# Patient Record
Sex: FEMALE | Race: WHITE | NOT HISPANIC OR LATINO | Employment: OTHER | URBAN - NONMETROPOLITAN AREA
[De-identification: names, ages, dates, MRNs, and addresses within clinical notes are randomized per-mention and may not be internally consistent; named-entity substitution may affect disease eponyms.]

---

## 2017-01-19 ENCOUNTER — TRANSCRIBE ORDERS (OUTPATIENT)
Dept: ADMINISTRATIVE | Facility: HOSPITAL | Age: 72
End: 2017-01-19

## 2017-01-19 DIAGNOSIS — Z13.9 SCREENING: Primary | ICD-10-CM

## 2017-02-03 ENCOUNTER — HOSPITAL ENCOUNTER (OUTPATIENT)
Dept: MAMMOGRAPHY | Facility: HOSPITAL | Age: 72
Discharge: HOME | End: 2017-02-03
Admitting: NURSE PRACTITIONER

## 2017-02-03 DIAGNOSIS — Z13.9 SCREENING: ICD-10-CM

## 2017-02-03 PROCEDURE — G0202 SCR MAMMO BI INCL CAD: HCPCS

## 2017-02-03 PROCEDURE — 77063 BREAST TOMOSYNTHESIS BI: CPT

## 2017-05-09 ENCOUNTER — OFFICE VISIT (OUTPATIENT)
Dept: SURGERY | Facility: CLINIC | Age: 72
End: 2017-05-09

## 2017-05-09 VITALS
SYSTOLIC BLOOD PRESSURE: 168 MMHG | RESPIRATION RATE: 16 BRPM | HEART RATE: 61 BPM | TEMPERATURE: 97.4 F | BODY MASS INDEX: 25.76 KG/M2 | WEIGHT: 154.6 LBS | HEIGHT: 65 IN | DIASTOLIC BLOOD PRESSURE: 62 MMHG | OXYGEN SATURATION: 99 %

## 2017-05-09 DIAGNOSIS — L72.3 SEBACEOUS CYST: ICD-10-CM

## 2017-05-09 DIAGNOSIS — M79.621 AXILLARY PAIN, RIGHT: Primary | ICD-10-CM

## 2017-05-09 PROCEDURE — 99203 OFFICE O/P NEW LOW 30 MIN: CPT | Performed by: SURGERY

## 2017-05-09 RX ORDER — LOSARTAN POTASSIUM 25 MG/1
25 TABLET ORAL DAILY
COMMUNITY

## 2017-05-09 RX ORDER — BIMATOPROST 0.01 %
DROPS OPHTHALMIC (EYE)
COMMUNITY
Start: 2017-04-03

## 2017-05-09 RX ORDER — SIMVASTATIN 40 MG
TABLET ORAL
COMMUNITY
Start: 2017-03-20

## 2017-05-09 RX ORDER — LOSARTAN POTASSIUM 25 MG/1
TABLET ORAL
COMMUNITY
Start: 2017-03-20 | End: 2017-05-09

## 2017-05-23 ENCOUNTER — OFFICE VISIT (OUTPATIENT)
Dept: SURGERY | Facility: CLINIC | Age: 72
End: 2017-05-23

## 2017-05-23 VITALS
SYSTOLIC BLOOD PRESSURE: 128 MMHG | DIASTOLIC BLOOD PRESSURE: 58 MMHG | RESPIRATION RATE: 16 BRPM | BODY MASS INDEX: 25.66 KG/M2 | WEIGHT: 154 LBS | OXYGEN SATURATION: 97 % | HEART RATE: 83 BPM | TEMPERATURE: 97.7 F | HEIGHT: 65 IN

## 2017-05-23 DIAGNOSIS — L72.3 SEBACEOUS CYST: Primary | ICD-10-CM

## 2017-05-23 PROCEDURE — 11402 EXC TR-EXT B9+MARG 1.1-2 CM: CPT | Performed by: SURGERY

## 2017-05-23 PROCEDURE — 12031 INTMD RPR S/A/T/EXT 2.5 CM/<: CPT | Performed by: SURGERY

## 2017-05-30 ENCOUNTER — OFFICE VISIT (OUTPATIENT)
Dept: SURGERY | Facility: CLINIC | Age: 72
End: 2017-05-30

## 2017-05-30 VITALS
BODY MASS INDEX: 25.66 KG/M2 | WEIGHT: 154 LBS | HEIGHT: 65 IN | TEMPERATURE: 98 F | HEART RATE: 80 BPM | DIASTOLIC BLOOD PRESSURE: 60 MMHG | SYSTOLIC BLOOD PRESSURE: 124 MMHG

## 2017-05-30 DIAGNOSIS — Z48.89 POSTOPERATIVE VISIT: Primary | ICD-10-CM

## 2017-05-30 PROCEDURE — 99024 POSTOP FOLLOW-UP VISIT: CPT | Performed by: SURGERY

## 2018-01-04 ENCOUNTER — TRANSCRIBE ORDERS (OUTPATIENT)
Dept: MAMMOGRAPHY | Facility: HOSPITAL | Age: 73
End: 2018-01-04

## 2018-01-04 DIAGNOSIS — Z12.39 BREAST CANCER SCREENING: Primary | ICD-10-CM

## 2018-02-05 ENCOUNTER — HOSPITAL ENCOUNTER (OUTPATIENT)
Dept: MAMMOGRAPHY | Facility: HOSPITAL | Age: 73
Discharge: HOME | End: 2018-02-05
Admitting: FAMILY MEDICINE

## 2018-02-05 DIAGNOSIS — Z12.39 BREAST CANCER SCREENING: ICD-10-CM

## 2018-02-05 PROCEDURE — 77063 BREAST TOMOSYNTHESIS BI: CPT

## 2018-02-05 PROCEDURE — 77067 SCR MAMMO BI INCL CAD: CPT

## 2019-01-07 ENCOUNTER — TRANSCRIBE ORDERS (OUTPATIENT)
Dept: ADMINISTRATIVE | Facility: HOSPITAL | Age: 74
End: 2019-01-07

## 2019-01-07 DIAGNOSIS — Z12.39 SCREENING BREAST EXAMINATION: Primary | ICD-10-CM

## 2019-02-08 ENCOUNTER — HOSPITAL ENCOUNTER (OUTPATIENT)
Dept: MAMMOGRAPHY | Facility: HOSPITAL | Age: 74
Discharge: HOME | End: 2019-02-08
Admitting: PHYSICIAN ASSISTANT

## 2019-02-08 DIAGNOSIS — Z12.39 SCREENING BREAST EXAMINATION: ICD-10-CM

## 2019-02-08 PROCEDURE — 77063 BREAST TOMOSYNTHESIS BI: CPT

## 2019-02-08 PROCEDURE — 77067 SCR MAMMO BI INCL CAD: CPT

## 2020-01-06 ENCOUNTER — TRANSCRIBE ORDERS (OUTPATIENT)
Dept: ADMINISTRATIVE | Facility: HOSPITAL | Age: 75
End: 2020-01-06

## 2020-01-06 DIAGNOSIS — Z12.31 BREAST CANCER SCREENING BY MAMMOGRAM: Primary | ICD-10-CM

## 2020-02-18 ENCOUNTER — HOSPITAL ENCOUNTER (OUTPATIENT)
Dept: MAMMOGRAPHY | Facility: HOSPITAL | Age: 75
Discharge: HOME | End: 2020-02-18
Admitting: PHYSICIAN ASSISTANT

## 2020-02-18 DIAGNOSIS — Z12.31 BREAST CANCER SCREENING BY MAMMOGRAM: ICD-10-CM

## 2020-02-18 PROCEDURE — 77067 SCR MAMMO BI INCL CAD: CPT

## 2020-02-18 PROCEDURE — 77063 BREAST TOMOSYNTHESIS BI: CPT

## 2020-09-28 ENCOUNTER — LAB (OUTPATIENT)
Dept: LAB | Facility: HOSPITAL | Age: 75
End: 2020-09-28

## 2020-09-28 ENCOUNTER — TRANSCRIBE ORDERS (OUTPATIENT)
Dept: ADMINISTRATIVE | Facility: HOSPITAL | Age: 75
End: 2020-09-28

## 2020-09-28 DIAGNOSIS — M79.10 MYALGIA: ICD-10-CM

## 2020-09-28 DIAGNOSIS — M13.0 POLYARTHROPATHY: ICD-10-CM

## 2020-09-28 DIAGNOSIS — D89.89 RADIATION CHIMERA (HCC): ICD-10-CM

## 2020-09-28 DIAGNOSIS — M13.0 POLYARTHROPATHY: Primary | ICD-10-CM

## 2020-09-28 LAB
CK SERPL-CCNC: 67 U/L (ref 20–180)
CRP SERPL-MCNC: 5.87 MG/DL (ref 0–0.5)
ERYTHROCYTE [SEDIMENTATION RATE] IN BLOOD: 49 MM/HR (ref 0–30)
URATE SERPL-MCNC: 3.8 MG/DL (ref 2.4–5.7)

## 2020-09-28 PROCEDURE — 84550 ASSAY OF BLOOD/URIC ACID: CPT

## 2020-09-28 PROCEDURE — 86140 C-REACTIVE PROTEIN: CPT

## 2020-09-28 PROCEDURE — 82550 ASSAY OF CK (CPK): CPT

## 2020-09-28 PROCEDURE — 85652 RBC SED RATE AUTOMATED: CPT

## 2020-09-28 PROCEDURE — 36415 COLL VENOUS BLD VENIPUNCTURE: CPT

## 2021-01-11 ENCOUNTER — TRANSCRIBE ORDERS (OUTPATIENT)
Dept: ADMINISTRATIVE | Facility: HOSPITAL | Age: 76
End: 2021-01-11

## 2021-01-11 DIAGNOSIS — Z12.31 VISIT FOR SCREENING MAMMOGRAM: Primary | ICD-10-CM

## 2021-02-24 ENCOUNTER — HOSPITAL ENCOUNTER (OUTPATIENT)
Dept: MAMMOGRAPHY | Facility: HOSPITAL | Age: 76
Discharge: HOME | End: 2021-02-24
Admitting: FAMILY MEDICINE

## 2021-02-24 DIAGNOSIS — Z12.31 VISIT FOR SCREENING MAMMOGRAM: ICD-10-CM

## 2021-02-24 PROCEDURE — 77063 BREAST TOMOSYNTHESIS BI: CPT

## 2021-02-24 PROCEDURE — 77067 SCR MAMMO BI INCL CAD: CPT

## 2021-03-31 ENCOUNTER — IMMUNIZATION (OUTPATIENT)
Dept: VACCINE CLINIC | Facility: HOSPITAL | Age: 76
End: 2021-03-31

## 2021-03-31 PROCEDURE — 0001A: CPT | Performed by: INTERNAL MEDICINE

## 2021-03-31 PROCEDURE — 91300 HC SARSCOV02 VAC 30MCG/0.3ML IM: CPT | Performed by: INTERNAL MEDICINE

## 2021-04-21 ENCOUNTER — IMMUNIZATION (OUTPATIENT)
Dept: VACCINE CLINIC | Facility: HOSPITAL | Age: 76
End: 2021-04-21

## 2021-04-21 PROCEDURE — 91300 HC SARSCOV02 VAC 30MCG/0.3ML IM: CPT | Performed by: INTERNAL MEDICINE

## 2021-04-21 PROCEDURE — 0002A: CPT | Performed by: INTERNAL MEDICINE

## 2022-02-11 ENCOUNTER — APPOINTMENT (OUTPATIENT)
Dept: URBAN - METROPOLITAN AREA CLINIC 289 | Age: 77
Setting detail: DERMATOLOGY
End: 2022-02-11

## 2022-02-11 DIAGNOSIS — L30.8 OTHER SPECIFIED DERMATITIS: ICD-10-CM

## 2022-02-11 DIAGNOSIS — Z85.828 PERSONAL HISTORY OF OTHER MALIGNANT NEOPLASM OF SKIN: ICD-10-CM

## 2022-02-11 DIAGNOSIS — L57.0 ACTINIC KERATOSIS: ICD-10-CM

## 2022-02-11 PROCEDURE — 99204 OFFICE O/P NEW MOD 45 MIN: CPT | Mod: 25

## 2022-02-11 PROCEDURE — 17000 DESTRUCT PREMALG LESION: CPT

## 2022-02-11 PROCEDURE — OTHER LIQUID NITROGEN: OTHER

## 2022-02-11 PROCEDURE — OTHER COUNSELING: OTHER

## 2022-02-11 PROCEDURE — OTHER SUNSCREEN RECOMMENDATIONS: OTHER

## 2022-02-11 PROCEDURE — OTHER OBSERVATION: OTHER

## 2022-02-11 PROCEDURE — 17003 DESTRUCT PREMALG LES 2-14: CPT

## 2022-02-11 PROCEDURE — OTHER PRESCRIPTION: OTHER

## 2022-02-11 RX ORDER — TRIAMCINOLONE ACETONIDE 1 MG/G
CREAM TOPICAL
Qty: 80 | Refills: 2 | Status: ERX | COMMUNITY
Start: 2022-02-11

## 2022-02-11 ASSESSMENT — LOCATION SIMPLE DESCRIPTION DERM
LOCATION SIMPLE: RIGHT CHEEK
LOCATION SIMPLE: LEFT HAND
LOCATION SIMPLE: LEFT EYEBROW
LOCATION SIMPLE: RIGHT FOREHEAD
LOCATION SIMPLE: RIGHT HAND

## 2022-02-11 ASSESSMENT — LOCATION DETAILED DESCRIPTION DERM
LOCATION DETAILED: RIGHT LATERAL MALAR CHEEK
LOCATION DETAILED: RIGHT RADIAL DORSAL HAND
LOCATION DETAILED: LEFT LATERAL EYEBROW
LOCATION DETAILED: LEFT CENTRAL EYEBROW
LOCATION DETAILED: LEFT ULNAR DORSAL HAND
LOCATION DETAILED: RIGHT INFERIOR CENTRAL MALAR CHEEK
LOCATION DETAILED: RIGHT INFERIOR FOREHEAD

## 2022-02-11 ASSESSMENT — TOTAL NUMBER OF LESIONS: # OF LESIONS?: 4

## 2022-02-11 ASSESSMENT — LOCATION ZONE DERM
LOCATION ZONE: HAND
LOCATION ZONE: FACE

## 2022-02-11 ASSESSMENT — BSA RASH: BSA RASH: 5

## 2022-02-11 NOTE — PROCEDURE: LIQUID NITROGEN
Number Of Freeze-Thaw Cycles: 1 freeze-thaw cycle
Show Aperture Variable?: Yes
Detail Level: Detailed
Render Post-Care Instructions In Note?: no
Consent: The patient's consent was obtained including but not limited to risks of crusting, scabbing, blistering, scarring, darker or lighter pigmentary change, recurrence, incomplete removal and infection.
Post-Care Instructions: I reviewed with the patient in detail post-care instructions. Patient is to wear sunprotection, and avoid picking at any of the treated lesions. Pt may apply Vaseline to crusted or scabbing areas.
Duration Of Freeze Thaw-Cycle (Seconds): 5

## 2022-10-26 ENCOUNTER — APPOINTMENT (OUTPATIENT)
Dept: URBAN - METROPOLITAN AREA CLINIC 289 | Age: 77
Setting detail: DERMATOLOGY
End: 2022-10-26

## 2022-10-26 DIAGNOSIS — L57.0 ACTINIC KERATOSIS: ICD-10-CM

## 2022-10-26 PROCEDURE — OTHER COUNSELING: OTHER

## 2022-10-26 PROCEDURE — 17000 DESTRUCT PREMALG LESION: CPT

## 2022-10-26 PROCEDURE — OTHER MIPS QUALITY: OTHER

## 2022-10-26 PROCEDURE — 17003 DESTRUCT PREMALG LES 2-14: CPT

## 2022-10-26 PROCEDURE — OTHER LIQUID NITROGEN: OTHER

## 2022-10-26 ASSESSMENT — LOCATION ZONE DERM
LOCATION ZONE: FACE
LOCATION ZONE: EYELID
LOCATION ZONE: NOSE
LOCATION ZONE: HAND
LOCATION ZONE: NECK
LOCATION ZONE: ARM

## 2022-10-26 ASSESSMENT — LOCATION DETAILED DESCRIPTION DERM
LOCATION DETAILED: NASAL DORSUM
LOCATION DETAILED: LEFT MEDIAL SUPERIOR EYELID
LOCATION DETAILED: LEFT DORSAL MIDDLE FINGER METACARPOPHALANGEAL JOINT
LOCATION DETAILED: LEFT ULNAR DORSAL HAND
LOCATION DETAILED: RIGHT CENTRAL MALAR CHEEK
LOCATION DETAILED: RIGHT INFERIOR CENTRAL MALAR CHEEK
LOCATION DETAILED: RIGHT SUPERIOR CENTRAL MALAR CHEEK
LOCATION DETAILED: RIGHT PROXIMAL DORSAL FOREARM
LOCATION DETAILED: RIGHT DORSAL WRIST
LOCATION DETAILED: LEFT LATERAL MALAR CHEEK
LOCATION DETAILED: RIGHT DORSAL MIDDLE FINGER METACARPOPHALANGEAL JOINT
LOCATION DETAILED: RIGHT FOREHEAD
LOCATION DETAILED: RIGHT SUPERIOR ANTERIOR NECK

## 2022-10-26 ASSESSMENT — LOCATION SIMPLE DESCRIPTION DERM
LOCATION SIMPLE: LEFT CHEEK
LOCATION SIMPLE: RIGHT WRIST
LOCATION SIMPLE: LEFT HAND
LOCATION SIMPLE: RIGHT FOREARM
LOCATION SIMPLE: RIGHT CHEEK
LOCATION SIMPLE: RIGHT HAND
LOCATION SIMPLE: NOSE
LOCATION SIMPLE: RIGHT FOREHEAD
LOCATION SIMPLE: LEFT SUPERIOR EYELID
LOCATION SIMPLE: RIGHT ANTERIOR NECK